# Patient Record
Sex: MALE | Race: WHITE | NOT HISPANIC OR LATINO | Employment: FULL TIME | URBAN - METROPOLITAN AREA
[De-identification: names, ages, dates, MRNs, and addresses within clinical notes are randomized per-mention and may not be internally consistent; named-entity substitution may affect disease eponyms.]

---

## 2017-10-25 ENCOUNTER — CLINICAL SUPPORT (OUTPATIENT)
Dept: OTHER | Facility: CLINIC | Age: 57
End: 2017-10-25
Payer: COMMERCIAL

## 2017-10-25 VITALS
DIASTOLIC BLOOD PRESSURE: 79 MMHG | WEIGHT: 185 LBS | HEIGHT: 68 IN | BODY MASS INDEX: 28.04 KG/M2 | SYSTOLIC BLOOD PRESSURE: 128 MMHG

## 2017-10-25 DIAGNOSIS — Z00.8 HEALTH EXAMINATION IN POPULATION SURVEYS: Primary | ICD-10-CM

## 2017-10-25 LAB
GLUCOSE SERPL-MCNC: 136 MG/DL (ref 60–140)
POC CHOLESTEROL, HDL: 44 MG/DL (ref 40–?)
POC CHOLESTEROL, LDL: 137 MG/DL (ref ?–160)
POC CHOLESTEROL, TOTAL: 238 MG/DL (ref ?–240)
POC GLUCOSE FASTING: ABNORMAL MG/DL (ref 60–110)
POC TOTAL CHOLESTEROL / HDL RATIO: 5.41 (ref ?–6)
POC TRIGLYCERIDES: 282 MG/DL (ref ?–160)

## 2017-10-25 PROCEDURE — 99401 PREV MED CNSL INDIV APPRX 15: CPT | Mod: S$GLB,,, | Performed by: INTERNAL MEDICINE

## 2017-10-25 PROCEDURE — 80061 LIPID PANEL: CPT | Mod: QW,S$GLB,, | Performed by: INTERNAL MEDICINE

## 2017-10-25 PROCEDURE — 82947 ASSAY GLUCOSE BLOOD QUANT: CPT | Mod: QW,S$GLB,, | Performed by: INTERNAL MEDICINE

## 2022-07-30 ENCOUNTER — HOSPITAL ENCOUNTER (EMERGENCY)
Facility: HOSPITAL | Age: 62
Discharge: HOME | End: 2022-07-30
Attending: EMERGENCY MEDICINE
Payer: COMMERCIAL

## 2022-07-30 VITALS
HEART RATE: 77 BPM | OXYGEN SATURATION: 98 % | HEIGHT: 68 IN | DIASTOLIC BLOOD PRESSURE: 77 MMHG | TEMPERATURE: 98 F | BODY MASS INDEX: 27.28 KG/M2 | RESPIRATION RATE: 15 BRPM | SYSTOLIC BLOOD PRESSURE: 119 MMHG | WEIGHT: 180 LBS

## 2022-07-30 DIAGNOSIS — R93.2 ABNORMAL COMPUTED TOMOGRAPHY OF GALLBLADDER: ICD-10-CM

## 2022-07-30 DIAGNOSIS — R07.9 CHEST PAIN: Primary | ICD-10-CM

## 2022-07-30 DIAGNOSIS — R10.13 EPIGASTRIC PAIN: ICD-10-CM

## 2022-07-30 LAB
ALBUMIN SERPL BCP-MCNC: 4.2 G/DL (ref 3.5–5.2)
ALP SERPL-CCNC: 58 U/L (ref 55–135)
ALT SERPL W/O P-5'-P-CCNC: 16 U/L (ref 10–44)
ANION GAP SERPL CALC-SCNC: 12 MMOL/L (ref 8–16)
AST SERPL-CCNC: 25 U/L (ref 10–40)
BASOPHILS # BLD AUTO: 0.09 K/UL (ref 0–0.2)
BASOPHILS NFR BLD: 0.8 % (ref 0–1.9)
BILIRUB SERPL-MCNC: 0.4 MG/DL (ref 0.1–1)
BUN SERPL-MCNC: 18 MG/DL (ref 6–20)
CALCIUM SERPL-MCNC: 9.7 MG/DL (ref 8.7–10.5)
CHLORIDE SERPL-SCNC: 106 MMOL/L (ref 95–110)
CK SERPL-CCNC: 469 U/L (ref 20–200)
CO2 SERPL-SCNC: 25 MMOL/L (ref 23–29)
CREAT SERPL-MCNC: 1.3 MG/DL (ref 0.5–1.4)
CTP QC/QA: YES
DIFFERENTIAL METHOD: ABNORMAL
EOSINOPHIL # BLD AUTO: 0.1 K/UL (ref 0–0.5)
EOSINOPHIL NFR BLD: 1.3 % (ref 0–8)
ERYTHROCYTE [DISTWIDTH] IN BLOOD BY AUTOMATED COUNT: 11.8 % (ref 11.5–14.5)
EST. GFR  (AFRICAN AMERICAN): >60 ML/MIN/1.73 M^2
EST. GFR  (NON AFRICAN AMERICAN): 59 ML/MIN/1.73 M^2
GLUCOSE SERPL-MCNC: 154 MG/DL (ref 70–110)
HCT VFR BLD AUTO: 37.7 % (ref 40–54)
HGB BLD-MCNC: 13 G/DL (ref 14–18)
IMM GRANULOCYTES # BLD AUTO: 0.03 K/UL (ref 0–0.04)
IMM GRANULOCYTES NFR BLD AUTO: 0.3 % (ref 0–0.5)
LIPASE SERPL-CCNC: 19 U/L (ref 4–60)
LYMPHOCYTES # BLD AUTO: 1.7 K/UL (ref 1–4.8)
LYMPHOCYTES NFR BLD: 16.3 % (ref 18–48)
MAGNESIUM SERPL-MCNC: 1.9 MG/DL (ref 1.6–2.6)
MCH RBC QN AUTO: 31.2 PG (ref 27–31)
MCHC RBC AUTO-ENTMCNC: 34.5 G/DL (ref 32–36)
MCV RBC AUTO: 90 FL (ref 82–98)
MONOCYTES # BLD AUTO: 0.6 K/UL (ref 0.3–1)
MONOCYTES NFR BLD: 5.6 % (ref 4–15)
NEUTROPHILS # BLD AUTO: 8.1 K/UL (ref 1.8–7.7)
NEUTROPHILS NFR BLD: 75.7 % (ref 38–73)
NRBC BLD-RTO: 0 /100 WBC
PLATELET # BLD AUTO: 197 K/UL (ref 150–450)
PMV BLD AUTO: 9.6 FL (ref 9.2–12.9)
POTASSIUM SERPL-SCNC: 4.1 MMOL/L (ref 3.5–5.1)
PROT SERPL-MCNC: 7.3 G/DL (ref 6–8.4)
RBC # BLD AUTO: 4.17 M/UL (ref 4.6–6.2)
SARS-COV-2 RDRP RESP QL NAA+PROBE: NEGATIVE
SODIUM SERPL-SCNC: 143 MMOL/L (ref 136–145)
TROPONIN I SERPL DL<=0.01 NG/ML-MCNC: <0.006 NG/ML (ref 0–0.03)
TROPONIN I SERPL DL<=0.01 NG/ML-MCNC: <0.006 NG/ML (ref 0–0.03)
WBC # BLD AUTO: 10.66 K/UL (ref 3.9–12.7)

## 2022-07-30 PROCEDURE — U0002 COVID-19 LAB TEST NON-CDC: HCPCS | Performed by: PHYSICIAN ASSISTANT

## 2022-07-30 PROCEDURE — 96375 TX/PRO/DX INJ NEW DRUG ADDON: CPT | Mod: 59

## 2022-07-30 PROCEDURE — 83690 ASSAY OF LIPASE: CPT | Performed by: PHYSICIAN ASSISTANT

## 2022-07-30 PROCEDURE — 25500020 PHARM REV CODE 255: Performed by: EMERGENCY MEDICINE

## 2022-07-30 PROCEDURE — 93005 ELECTROCARDIOGRAM TRACING: CPT

## 2022-07-30 PROCEDURE — 25000003 PHARM REV CODE 250: Performed by: PHYSICIAN ASSISTANT

## 2022-07-30 PROCEDURE — 63600175 PHARM REV CODE 636 W HCPCS: Performed by: PHYSICIAN ASSISTANT

## 2022-07-30 PROCEDURE — 84484 ASSAY OF TROPONIN QUANT: CPT | Performed by: PHYSICIAN ASSISTANT

## 2022-07-30 PROCEDURE — 96374 THER/PROPH/DIAG INJ IV PUSH: CPT

## 2022-07-30 PROCEDURE — 93010 ELECTROCARDIOGRAM REPORT: CPT | Mod: ,,, | Performed by: INTERNAL MEDICINE

## 2022-07-30 PROCEDURE — 99285 EMERGENCY DEPT VISIT HI MDM: CPT | Mod: 25

## 2022-07-30 PROCEDURE — 82550 ASSAY OF CK (CPK): CPT | Performed by: PHYSICIAN ASSISTANT

## 2022-07-30 PROCEDURE — 83735 ASSAY OF MAGNESIUM: CPT | Performed by: PHYSICIAN ASSISTANT

## 2022-07-30 PROCEDURE — 93010 EKG 12-LEAD: ICD-10-PCS | Mod: ,,, | Performed by: INTERNAL MEDICINE

## 2022-07-30 PROCEDURE — 80053 COMPREHEN METABOLIC PANEL: CPT | Performed by: PHYSICIAN ASSISTANT

## 2022-07-30 PROCEDURE — 85025 COMPLETE CBC W/AUTO DIFF WBC: CPT | Performed by: PHYSICIAN ASSISTANT

## 2022-07-30 RX ORDER — MAG HYDROX/ALUMINUM HYD/SIMETH 200-200-20
30 SUSPENSION, ORAL (FINAL DOSE FORM) ORAL ONCE
Status: COMPLETED | OUTPATIENT
Start: 2022-07-30 | End: 2022-07-30

## 2022-07-30 RX ORDER — FAMOTIDINE 10 MG/ML
20 INJECTION INTRAVENOUS
Status: COMPLETED | OUTPATIENT
Start: 2022-07-30 | End: 2022-07-30

## 2022-07-30 RX ORDER — ONDANSETRON 2 MG/ML
4 INJECTION INTRAMUSCULAR; INTRAVENOUS
Status: COMPLETED | OUTPATIENT
Start: 2022-07-30 | End: 2022-07-30

## 2022-07-30 RX ORDER — LIDOCAINE HYDROCHLORIDE 20 MG/ML
15 SOLUTION OROPHARYNGEAL ONCE
Status: COMPLETED | OUTPATIENT
Start: 2022-07-30 | End: 2022-07-30

## 2022-07-30 RX ORDER — FAMOTIDINE 20 MG/1
20 TABLET, FILM COATED ORAL 2 TIMES DAILY
Qty: 28 TABLET | Refills: 0 | Status: SHIPPED | OUTPATIENT
Start: 2022-07-30 | End: 2022-08-13

## 2022-07-30 RX ORDER — ONDANSETRON 4 MG/1
4 TABLET, ORALLY DISINTEGRATING ORAL EVERY 6 HOURS PRN
Qty: 12 TABLET | Refills: 0 | Status: SHIPPED | OUTPATIENT
Start: 2022-07-30

## 2022-07-30 RX ORDER — MORPHINE SULFATE 4 MG/ML
4 INJECTION, SOLUTION INTRAMUSCULAR; INTRAVENOUS
Status: COMPLETED | OUTPATIENT
Start: 2022-07-30 | End: 2022-07-30

## 2022-07-30 RX ADMIN — LIDOCAINE HYDROCHLORIDE 15 ML: 20 SOLUTION ORAL; TOPICAL at 04:07

## 2022-07-30 RX ADMIN — MORPHINE SULFATE 4 MG: 4 INJECTION INTRAVENOUS at 03:07

## 2022-07-30 RX ADMIN — IOHEXOL 85 ML: 350 INJECTION, SOLUTION INTRAVENOUS at 04:07

## 2022-07-30 RX ADMIN — ALUMINUM HYDROXIDE, MAGNESIUM HYDROXIDE, AND SIMETHICONE 30 ML: 200; 200; 20 SUSPENSION ORAL at 04:07

## 2022-07-30 RX ADMIN — ONDANSETRON 4 MG: 2 INJECTION INTRAMUSCULAR; INTRAVENOUS at 03:07

## 2022-07-30 RX ADMIN — FAMOTIDINE 20 MG: 10 INJECTION, SOLUTION INTRAVENOUS at 03:07

## 2022-07-30 NOTE — DISCHARGE INSTRUCTIONS
Pepcid twice daily. Try to drink more fluids. Zofran as needed for any nausea. Please contact your primary care provider for follow-up and further recommendations.     Return if chest pain worsens, if you begin with shortness of breath, if you feel lightheaded or feel as if you are going to pass out, if uncontrolled vomiting, if you begin with severe cramping pain, if any other problems occur.

## 2022-07-30 NOTE — ED TRIAGE NOTES
Pt reports woke up out of sleep around midnight. States has constant pain across epigastric area. States feels like cramping. Reports 5 episodes of vomitting.

## 2022-07-30 NOTE — ED PROVIDER NOTES
"Encounter Date: 7/30/2022       History     Chief Complaint   Patient presents with    Chest Pain     Pt reports being woken from sleep at midnight tonight with constant pain all across epigastric/midsternal chest area describes as "cramping" and 5 episodes of vomiting; denies hx of cardiac or pulmonary issues; no meds taken; denies any other symptoms;      61yo M with chief complaint acute onset lower substernal chest pain, epigastric pain, which woke pt from sleep around 1am this morning. Admits to associated nausea with multiple episodes of small volume NBNB emesis pta. Pain described as a constant, severe pressure. Nonradiating. No ripping/tearing pain. No associated back pain. No abdominal pain. No headache. No lightheadedness or dizziness. No syncope or near syncope. No cough. No recent illness. No fever. No personal hx ACS. No HTN, HLD, DM, or obesity. Nonsmoker. +family hx cardiac dz, unsure if premature.     Admits to similar episode in the remote past; states he received fluids and was fine afterwards. He states he did work outside tearing down a porch on Friday. Friday afternoon experienced severe cramp to his L hand, R foot, resolved after a few minutes. Denies myalgias. Denies any persistent joint pain or swelling. States urine a bit darker than usual but no coke-colored urine; continues to urinate without issue.    Surgical hx: appendectomy    PMH:  Allergic rhinitis  Chronic maxillary sinusitis  NETTA on CPAP  Diverticulosis        Review of patient's allergies indicates:  No Known Allergies  History reviewed. No pertinent past medical history.  Past Surgical History:   Procedure Laterality Date    APPENDECTOMY N/A 9/3/2015    Procedure: APPENDECTOMY;  Surgeon: Channing Lawrence MD;  Location: [facility];  Service: General;  Laterality: N/A;     Family History   Problem Relation Age of Onset    Hypertension Mother     Cancer Sister     Heart attack Maternal Uncle     Pancreatic cancer Father      Social " History     Tobacco Use    Smoking status: Never Smoker    Smokeless tobacco: Never Used   Substance Use Topics    Alcohol use: No    Drug use: No     Review of Systems   Constitutional: Negative for appetite change, chills and fever.   Respiratory: Negative for cough and shortness of breath.    Cardiovascular: Positive for chest pain.   Gastrointestinal: Positive for abdominal pain, nausea and vomiting.   Musculoskeletal: Positive for arthralgias. Negative for back pain, gait problem, joint swelling, neck pain and neck stiffness.   Skin: Negative for rash and wound.   Neurological: Negative for syncope, weakness, light-headedness and headaches.       Physical Exam     Initial Vitals [07/30/22 0253]   BP Pulse Resp Temp SpO2   (!) 150/96 70 17 97.8 °F (36.6 °C) 99 %      MAP       --         Physical Exam    Nursing note and vitals reviewed.  Constitutional: He appears well-developed and well-nourished. He is not diaphoretic. No distress.   Well-appearing, nontoxic. Sitting upright on exam table.    HENT:   Head: Normocephalic and atraumatic.   Mouth/Throat: Oropharynx is clear and moist.   Neck: Neck supple.   Normal range of motion.  Cardiovascular: Intact distal pulses.   2+DP bilaterally, 2+radial bilaterally. No pretibial edema. No unilateral leg swelling or calf ttp.    Pulmonary/Chest: Breath sounds normal. No respiratory distress. He exhibits no tenderness.   Abdominal: Abdomen is soft. Bowel sounds are normal. He exhibits no distension. There is no abdominal tenderness.   Negative Villalobos sign   Musculoskeletal:         General: No tenderness. Normal range of motion.      Cervical back: Normal range of motion and neck supple.     Neurological: He is alert and oriented to person, place, and time. He has normal strength. GCS score is 15. GCS eye subscore is 4. GCS verbal subscore is 5. GCS motor subscore is 6.   Grossly symmetric upper and lower extremity strength. No lower extremity motor drift. Normal,  steady gait.   Skin: Skin is warm. Capillary refill takes less than 2 seconds.   Psychiatric: He has a normal mood and affect. Thought content normal.         ED Course   Procedures  Labs Reviewed   CBC W/ AUTO DIFFERENTIAL - Abnormal; Notable for the following components:       Result Value    RBC 4.17 (*)     Hemoglobin 13.0 (*)     Hematocrit 37.7 (*)     MCH 31.2 (*)     Gran # (ANC) 8.1 (*)     Gran % 75.7 (*)     Lymph % 16.3 (*)     All other components within normal limits   COMPREHENSIVE METABOLIC PANEL - Abnormal; Notable for the following components:    Glucose 154 (*)     eGFR if non  59 (*)     All other components within normal limits   CK - Abnormal; Notable for the following components:     (*)     All other components within normal limits   SARS-COV-2 RDRP GENE - Normal   TROPONIN I   MAGNESIUM   LIPASE   CK   TROPONIN I   B-TYPE NATRIURETIC PEPTIDE     EKG Readings: (Independently Interpreted)   Normal sinus rhythm, ventricular rate 71 beats per minute.  Normal SD, normal QT.  No right axis deviation.  No ST elevation.  Flattening versus inversion T-wave lead 3, inverted T-wave V1.  Questionable Q-waves.  No gross change from previous dated 09/03/2015.       Imaging Results          CT Chest Abdomen Pelvis With Contrast (xpd) (Final result)  Result time 07/30/22 05:59:30    Final result by Rama Aceves MD (07/30/22 05:59:30)                 Impression:      1. No acute intrathoracic abnormality.  2. Right lower lobe 5 mm pulmonary nodule.  For a solid nodule <6 mm, Fleischner Society 2017 guidelines recommend no routine follow up for a low risk patient, or follow-up with non-contrast chest CT at 12 months in a high risk patient.  3. Slightly distended gallbladder with minimal intrahepatic biliary ductal dilatation.  Further evaluation with ultrasound can be performed as clinically warranted.  4. Prostatomegaly.  5. Additional findings as above.      Electronically signed  by: Rama Aceves MD  Date:    07/30/2022  Time:    05:59             Narrative:    EXAMINATION:  CT CHEST ABDOMEN PELVIS WITH CONTRAST (XPD)    CLINICAL HISTORY:  Aortic dissection suspected;    TECHNIQUE:  Low dose axial images, sagittal and coronal reformations were obtained from the thoracic inlet to the pubic symphysis following the IV administration of 85 mL of Omnipaque 350 .  No oral contrast.    COMPARISON:  CT abdomen and pelvis 09/03/2015    FINDINGS:  The visualized soft tissue and vascular structures at the base of the neck are within normal limits.  The thoracic aorta is normal in caliber, contour and course on this routine CT chest, abdomen and pelvis.  The heart is not enlarged. No pericardial fluid is present. No axillary or mediastinal adenopathy. Hilar contours are within normal limits. The esophagus maintains normal caliber and course.    The trachea is midline and the proximal airways are patent.  The lungs are symmetrically expanded with dependent bibasilar atelectasis. There is a 5 mm right lower lobe pulmonary nodule present.  For a solid nodule <6 mm, Fleischner Society 2017 guidelines recommend no routine follow up for a low risk patient, or follow-up with non-contrast chest CT at 12 months in a high risk patient.  No pleural fluid, focal consolidation or pneumothorax is identified.  No large central filling defect identified to suggest large central pulmonary embolus noting this exam was not optimized for evaluation of the pulmonary arteries.    The is normal in size without focal abnormality.  The portal vein and splenic vein are patent.  The gallbladder is slightly distended without calcified stone in its lumen.  There is slight intrahepatic biliary ductal dilatation.  Further evaluation with right upper quadrant ultrasound can be performed.  The spleen and adrenal glands are unremarkable.  The pancreas demonstrates no significant peripancreatic inflammatory change or fat  stranding.    The kidneys are normal in size and location and enhance symmetrically no evidence of hydronephrosis.  The urinary bladder is distended with smooth margins.  The prostate is prominent in size.    The abdominal aorta is nonaneurysmal with minimal scattered atherosclerosis.  No periaortic fluid.  No retroperitoneal fluid or hemorrhage.    The stomach is distended.  The visualized loops of large and small bowel demonstrate no evidence of obstruction or inflammatory change.  There is a moderate volume of fecal material in the colon.  The appendix is not definitively visualized, possibly surgically absent.  There is no right lower quadrant inflammatory change.  There are normal and minimally prominent right abdominal mesenteric lymph nodes present.  There is no free intraperitoneal air, portal venous gas or ascites.    The visualized osseous structures demonstrate retrolisthesis of L5 on S1 with intervertebral disc height loss and vacuum disc phenomenon.                               X-Ray Chest AP Portable (Final result)  Result time 07/30/22 03:41:24    Final result by Rama Aceves MD (07/30/22 03:41:24)                 Impression:      No acute intrathoracic abnormality identified on this single radiographic view of the chest.      Electronically signed by: Rama Aceves MD  Date:    07/30/2022  Time:    03:41             Narrative:    EXAMINATION:  XR CHEST AP PORTABLE    CLINICAL HISTORY:  Chest Pain;    TECHNIQUE:  Single frontal view of the chest was performed.    COMPARISON:  09/03/2015    FINDINGS:  The cardiomediastinal silhouette is within normal limits. The visualized airway is unremarkable.  The lungs appear symmetrically expanded without definite evidence of confluent airspace consolidation, significant volume of pleural fluid or pneumothorax.  Visualized osseous structures are intact.                                 Medications   famotidine (PF) injection 20 mg (20 mg Intravenous Given  7/30/22 0319)   ondansetron injection 4 mg (4 mg Intravenous Given 7/30/22 0319)   morphine injection 4 mg (4 mg Intravenous Given 7/30/22 0345)   aluminum-magnesium hydroxide-simethicone 200-200-20 mg/5 mL suspension 30 mL (30 mLs Oral Given 7/30/22 0436)     And   LIDOcaine HCl 2% oral solution 15 mL (15 mLs Oral Given 7/30/22 0436)   iohexoL (OMNIPAQUE 350) injection 85 mL (85 mLs Intravenous Given 7/30/22 0429)     Medical Decision Making:   Differential Diagnosis:   ACS, GERD, aortic dissection, rhabdomyolysis, heat exhaustion, pancreatitis, aspiration pneumonia, pneumothorax  Clinical Tests:   Lab Tests: Ordered  Radiological Study: Ordered  Medical Tests: Ordered and Reviewed    Additional MDM:   Heart Score:    History:          Slightly suspicious.  ECG:             Nonspecific repolarisation disturbance  Age:               45-65 years  Risk factors: no risk factors known  Troponin:       1-2x normal limit  Final Score: 3              ED Course as of 07/30/22 0621   Sat Jul 30, 2022   0357 Pt still with chest discomfort, epigastric pain. Will order imaging, morphine ordered for pain, will reevaluate.  [SM]   0547 Awaiting second troponin, CT read for final disposition.  [SM]   0619 CT with distended gallbladder, mild amount of intrahepatic biliary duct dilatation.  Repeated abdominal exam without any tenderness.  Negative Villalobos sign.  No leukocytosis.  Normal liver enzymes.  Denies history of prandial or postprandial pain.  At this time, do not feel need for gallbladder ultrasound.  I feel can be safely discharged to follow-up as an outpatient should this be the culprit of his symptoms today.  Moderate stool burden, denies any issues with BMs.  No persistent pain on re-evaluation.  Low HEART score.  Low suspicion for surgical abdomen.  Low suspicion for emergent process or need for continued investigation.  I feel he is safe for outpatient follow-up.  Patient comfortable with plan. [SM]      ED Course User  Index  [SM] Matthias Carlos PA-C             Clinical Impression:   Final diagnoses:  [R07.9] Chest pain (Primary)  [R10.13] Epigastric pain  [R93.2] Abnormal computed tomography of gallbladder          ED Disposition Condition    Discharge Stable        ED Prescriptions     Medication Sig Dispense Start Date End Date Auth. Provider    famotidine (PEPCID) 20 MG tablet Take 1 tablet (20 mg total) by mouth 2 (two) times daily. for 14 days 28 tablet 7/30/2022 8/13/2022 Matthias Carlos PA-C    ondansetron (ZOFRAN-ODT) 4 MG TbDL Take 1 tablet (4 mg total) by mouth every 6 (six) hours as needed (nausea). 12 tablet 7/30/2022  Matthias Carlos PA-C        Follow-up Information     Follow up With Specialties Details Why Contact Info    Edis Perez Jr., MD Family Medicine Schedule an appointment as soon as possible for a visit  For reevaluation [address]  [facility] [address]  [phone]      Rosa Lester MD Gastroenterology Schedule an appointment as soon as possible for a visit  For reevaluation by GI physician [facility] [facility] [address]  [phone]             Matthias Carlos PA-C  07/30/22 0654